# Patient Record
Sex: FEMALE | Race: BLACK OR AFRICAN AMERICAN | NOT HISPANIC OR LATINO | URBAN - METROPOLITAN AREA
[De-identification: names, ages, dates, MRNs, and addresses within clinical notes are randomized per-mention and may not be internally consistent; named-entity substitution may affect disease eponyms.]

---

## 2021-12-16 ENCOUNTER — OFFICE VISIT (OUTPATIENT)
Dept: URBAN - METROPOLITAN AREA CLINIC 17 | Facility: CLINIC | Age: 41
End: 2021-12-16
Payer: COMMERCIAL

## 2021-12-16 ENCOUNTER — WEB ENCOUNTER (OUTPATIENT)
Dept: URBAN - METROPOLITAN AREA CLINIC 17 | Facility: CLINIC | Age: 41
End: 2021-12-16

## 2021-12-16 ENCOUNTER — LAB OUTSIDE AN ENCOUNTER (OUTPATIENT)
Dept: URBAN - METROPOLITAN AREA CLINIC 17 | Facility: CLINIC | Age: 41
End: 2021-12-16

## 2021-12-16 VITALS
WEIGHT: 188 LBS | BODY MASS INDEX: 35.5 KG/M2 | HEART RATE: 101 BPM | SYSTOLIC BLOOD PRESSURE: 149 MMHG | TEMPERATURE: 97.1 F | HEIGHT: 61 IN | DIASTOLIC BLOOD PRESSURE: 95 MMHG

## 2021-12-16 DIAGNOSIS — R74.01 ELEVATED ALT MEASUREMENT: ICD-10-CM

## 2021-12-16 DIAGNOSIS — R03.0 ELEVATED BP WITHOUT DIAGNOSIS OF HYPERTENSION: ICD-10-CM

## 2021-12-16 DIAGNOSIS — E66.9 OBESITY (BMI 30-39.9): ICD-10-CM

## 2021-12-16 DIAGNOSIS — Z80.0 FAMILY HISTORY OF COLON CANCER: ICD-10-CM

## 2021-12-16 PROCEDURE — 99204 OFFICE O/P NEW MOD 45 MIN: CPT | Performed by: INTERNAL MEDICINE

## 2021-12-16 RX ORDER — ERGOCALCIFEROL 1.25 MG/1
CAPSULE ORAL
Qty: 0 | Refills: 0 | Status: ACTIVE | COMMUNITY
Start: 1900-01-01

## 2021-12-16 NOTE — HPI-TODAY'S VISIT:
12/16/21 40 yo lady pt of DR Bhumika Ramirez.  She has a sister who was diagnosed with CRC at age 25 Her colonoscopy in 2016 was wnl.  She also adds her PCP told her her liver enzymes were elevated. She shows me an ALT 88 from 9/2021.  She drinks a glass of wine once or twice a week.  No fhx of liver disease. no jaundice. No tattoos. No h/o IVDU, no blood transfusions.  BMs are regular, daily. no blood in stool.

## 2021-12-18 LAB
ACTIN (SMOOTH MUSCLE) ANTIBODY: 11
ALBUMIN: 4
ALKALINE PHOSPHATASE: 59
ALT (SGPT): 15
AST (SGOT): 19
BILIRUBIN, DIRECT: 0.14
BILIRUBIN, TOTAL: 0.4
HBSAG SCREEN: NEGATIVE
HCV AB: <0.1
HEP A AB, IGM: NEGATIVE
HEP B CORE AB, IGM: NEGATIVE
INTERPRETATION:: (no result)
MITOCHONDRIAL (M2) ANTIBODY: <20
PROTEIN, TOTAL: 7.2

## 2022-09-21 ENCOUNTER — WEB ENCOUNTER (OUTPATIENT)
Dept: URBAN - METROPOLITAN AREA CLINIC 17 | Facility: CLINIC | Age: 42
End: 2022-09-21

## 2022-09-27 ENCOUNTER — DASHBOARD ENCOUNTERS (OUTPATIENT)
Age: 42
End: 2022-09-27

## 2022-09-27 ENCOUNTER — OFFICE VISIT (OUTPATIENT)
Dept: URBAN - METROPOLITAN AREA CLINIC 17 | Facility: CLINIC | Age: 42
End: 2022-09-27
Payer: COMMERCIAL

## 2022-09-27 ENCOUNTER — LAB OUTSIDE AN ENCOUNTER (OUTPATIENT)
Dept: URBAN - METROPOLITAN AREA CLINIC 17 | Facility: CLINIC | Age: 42
End: 2022-09-27

## 2022-09-27 VITALS
SYSTOLIC BLOOD PRESSURE: 161 MMHG | TEMPERATURE: 97.9 F | DIASTOLIC BLOOD PRESSURE: 103 MMHG | HEART RATE: 79 BPM | WEIGHT: 186 LBS | BODY MASS INDEX: 35.12 KG/M2 | HEIGHT: 61 IN

## 2022-09-27 DIAGNOSIS — R74.01 ELEVATED ALT MEASUREMENT: ICD-10-CM

## 2022-09-27 DIAGNOSIS — K76.0 FATTY LIVER: ICD-10-CM

## 2022-09-27 DIAGNOSIS — R03.0 ELEVATED BP WITHOUT DIAGNOSIS OF HYPERTENSION: ICD-10-CM

## 2022-09-27 DIAGNOSIS — Z80.0 FAMILY HISTORY OF COLON CANCER: ICD-10-CM

## 2022-09-27 DIAGNOSIS — K80.20 GALLSTONES: ICD-10-CM

## 2022-09-27 DIAGNOSIS — E66.9 OBESITY (BMI 30-39.9): ICD-10-CM

## 2022-09-27 PROBLEM — 197321007: Status: ACTIVE | Noted: 2022-09-27

## 2022-09-27 PROBLEM — 235919008: Status: ACTIVE | Noted: 2022-09-27

## 2022-09-27 PROBLEM — 162864005: Status: ACTIVE | Noted: 2021-12-16

## 2022-09-27 PROCEDURE — 99214 OFFICE O/P EST MOD 30 MIN: CPT | Performed by: INTERNAL MEDICINE

## 2022-09-27 RX ORDER — ERGOCALCIFEROL 1.25 MG/1
CAPSULE ORAL
Qty: 0 | Refills: 0 | Status: ON HOLD | COMMUNITY
Start: 1900-01-01

## 2022-09-27 RX ORDER — POLYETHYLENE GLYCOL-3350, SODIUM CHLORIDE, POTASSIUM CHLORIDE AND SODIUM BICARBONATE 420; 11.2; 5.72; 1.48 G/438.4G; G/438.4G; G/438.4G; G/438.4G
AS DIRECTED POWDER, FOR SOLUTION ORAL ONCE
Qty: 1 KIT | Refills: 0 | OUTPATIENT
Start: 2022-09-27 | End: 2022-09-28

## 2022-09-27 NOTE — HPI-TODAY'S VISIT:
12/16/21 42 yo lady pt of DR Bhumika Ramirez.  She has a sister who was diagnosed with CRC at age 25 Her colonoscopy in 2016 was wnl.  She also adds her PCP told her her liver enzymes were elevated. She shows me an ALT 88 from 9/2021.  She drinks a glass of wine once or twice a week.  No fhx of liver disease. no jaundice. No tattoos. No h/o IVDU, no blood transfusions.  BMs are regular, daily. no blood in stool.  9/27/22 Here for f/u visit did not complete colonoscopy Discussed US 6/2022 showing gallstones and fatty liver. Says sometimes will have sharp RUQ pain with food.  Has regular BMs with intermittent constipation.

## 2022-10-04 ENCOUNTER — TELEPHONE ENCOUNTER (OUTPATIENT)
Dept: URBAN - METROPOLITAN AREA CLINIC 17 | Facility: CLINIC | Age: 42
End: 2022-10-04

## 2022-10-10 ENCOUNTER — TELEPHONE ENCOUNTER (OUTPATIENT)
Dept: URBAN - METROPOLITAN AREA CLINIC 17 | Facility: CLINIC | Age: 42
End: 2022-10-10

## 2022-10-11 ENCOUNTER — WEB ENCOUNTER (OUTPATIENT)
Dept: URBAN - METROPOLITAN AREA SURGERY CENTER 16 | Facility: SURGERY CENTER | Age: 42
End: 2022-10-11

## 2022-10-17 ENCOUNTER — OFFICE VISIT (OUTPATIENT)
Dept: URBAN - METROPOLITAN AREA SURGERY CENTER 16 | Facility: SURGERY CENTER | Age: 42
End: 2022-10-17
Payer: COMMERCIAL

## 2022-10-17 DIAGNOSIS — Z12.11 COLON CANCER SCREENING: ICD-10-CM

## 2022-10-17 DIAGNOSIS — Z80.0 BROTHER AT YOUNG AGE FAMILY HISTORY OF COLON CANCER: ICD-10-CM

## 2022-10-17 PROCEDURE — G8907 PT DOC NO EVENTS ON DISCHARG: HCPCS | Performed by: INTERNAL MEDICINE

## 2022-10-17 PROCEDURE — G0105 COLORECTAL SCRN; HI RISK IND: HCPCS | Performed by: INTERNAL MEDICINE

## 2022-10-17 RX ORDER — ERGOCALCIFEROL 1.25 MG/1
CAPSULE ORAL
Qty: 0 | Refills: 0 | Status: ON HOLD | COMMUNITY
Start: 1900-01-01